# Patient Record
Sex: FEMALE | Race: WHITE | URBAN - METROPOLITAN AREA
[De-identification: names, ages, dates, MRNs, and addresses within clinical notes are randomized per-mention and may not be internally consistent; named-entity substitution may affect disease eponyms.]

---

## 2018-03-02 ENCOUNTER — FOLLOW UP ESTABLISHED (OUTPATIENT)
Dept: URBAN - METROPOLITAN AREA CLINIC 10 | Facility: CLINIC | Age: 58
End: 2018-03-02
Payer: COMMERCIAL

## 2018-03-02 DIAGNOSIS — H25.13 AGE-RELATED NUCLEAR CATARACT, BILATERAL: ICD-10-CM

## 2018-03-02 DIAGNOSIS — H40.013 OPEN ANGLE WITH BORDERLINE FINDINGS, LOW RISK, BILATERAL: Primary | ICD-10-CM

## 2018-03-02 PROCEDURE — 92133 CPTRZD OPH DX IMG PST SGM ON: CPT | Performed by: OPTOMETRIST

## 2018-03-02 PROCEDURE — 92083 EXTENDED VISUAL FIELD XM: CPT | Performed by: OPTOMETRIST

## 2018-03-02 PROCEDURE — 92014 COMPRE OPH EXAM EST PT 1/>: CPT | Performed by: OPTOMETRIST

## 2018-03-02 RX ORDER — LATANOPROST 50 UG/ML
0.005 % SOLUTION OPHTHALMIC
Qty: 3 | Refills: 2 | Status: INACTIVE
Start: 2018-03-02 | End: 2019-05-24

## 2018-03-02 ASSESSMENT — INTRAOCULAR PRESSURE
OD: 14
OS: 14

## 2019-11-08 ENCOUNTER — FOLLOW UP ESTABLISHED (OUTPATIENT)
Dept: URBAN - METROPOLITAN AREA CLINIC 10 | Facility: CLINIC | Age: 59
End: 2019-11-08
Payer: COMMERCIAL

## 2019-11-08 PROCEDURE — 92083 EXTENDED VISUAL FIELD XM: CPT | Performed by: OPTOMETRIST

## 2019-11-08 PROCEDURE — 92014 COMPRE OPH EXAM EST PT 1/>: CPT | Performed by: OPTOMETRIST

## 2019-11-08 PROCEDURE — 92133 CPTRZD OPH DX IMG PST SGM ON: CPT | Performed by: OPTOMETRIST

## 2019-11-08 ASSESSMENT — INTRAOCULAR PRESSURE
OD: 13
OS: 13

## 2021-03-31 ENCOUNTER — OFFICE VISIT (OUTPATIENT)
Dept: URBAN - METROPOLITAN AREA CLINIC 10 | Facility: CLINIC | Age: 61
End: 2021-03-31
Payer: COMMERCIAL

## 2021-03-31 PROCEDURE — 92133 CPTRZD OPH DX IMG PST SGM ON: CPT | Performed by: OPTOMETRIST

## 2021-03-31 PROCEDURE — 92083 EXTENDED VISUAL FIELD XM: CPT | Performed by: OPTOMETRIST

## 2021-03-31 PROCEDURE — 92014 COMPRE OPH EXAM EST PT 1/>: CPT | Performed by: OPTOMETRIST

## 2021-03-31 RX ORDER — LATANOPROST 50 UG/ML
0.005 % SOLUTION OPHTHALMIC
Qty: 7.5 | Refills: 6 | Status: ACTIVE
Start: 2021-03-31

## 2021-03-31 ASSESSMENT — VISUAL ACUITY: OS: 20/20

## 2021-03-31 ASSESSMENT — INTRAOCULAR PRESSURE
OD: 13
OS: 11

## 2021-03-31 NOTE — IMPRESSION/PLAN
Impression: Age-related nuclear cataract, bilateral Plan: No treatment currently recommended due to level of vision. Patient will monitor vision changes and contact us with any decrease in vision, will re-evaluate cataract on return visit.

## 2021-03-31 NOTE — IMPRESSION/PLAN
Impression: Open angle with borderline findings, low risk, bilateral
Pt denies fam hx glc; pt has hx heart problems/lung probs/sleep apnea/migraines/sulfa allergy; Mild cupping Asymmetry; Thin pachs- increased risk; Plan: Continue Xalatan QHS OU; Discs and IOPs remain stable. rtc IOP in 12 months with VFT/OCT/CEE  (alternates 6 mon with Dr. Clarence Morris) ( Patient works with [employer] with [place] ) HVF clean OU. OCT RNFL normal OU. Discussed Glaucoma diagnosis in detail with patient. Emphasized and explained compliance. Poor compliance can lead to blindness.